# Patient Record
Sex: FEMALE | Race: BLACK OR AFRICAN AMERICAN | NOT HISPANIC OR LATINO | Employment: FULL TIME | RURAL
[De-identification: names, ages, dates, MRNs, and addresses within clinical notes are randomized per-mention and may not be internally consistent; named-entity substitution may affect disease eponyms.]

---

## 2020-01-22 ENCOUNTER — HISTORICAL (OUTPATIENT)
Dept: ADMINISTRATIVE | Facility: HOSPITAL | Age: 45
End: 2020-01-22

## 2020-01-25 LAB
LAB AP CLINICAL INFORMATION: NORMAL
LAB AP COMMENTS: NORMAL
LAB AP GENERAL CAT - HISTORICAL: NORMAL
LAB AP INTERPRETATION/RESULT - HISTORICAL: NEGATIVE
LAB AP SPECIMEN ADEQUACY - HISTORICAL: NORMAL
LAB AP SPECIMEN SUBMITTED - HISTORICAL: NORMAL

## 2021-02-02 ENCOUNTER — HISTORICAL (OUTPATIENT)
Dept: ADMINISTRATIVE | Facility: HOSPITAL | Age: 46
End: 2021-02-02

## 2021-04-23 ENCOUNTER — HISTORICAL (OUTPATIENT)
Dept: ADMINISTRATIVE | Facility: HOSPITAL | Age: 46
End: 2021-04-23

## 2021-08-04 ENCOUNTER — OFFICE VISIT (OUTPATIENT)
Dept: OBSTETRICS AND GYNECOLOGY | Facility: CLINIC | Age: 46
End: 2021-08-04
Payer: COMMERCIAL

## 2021-08-04 VITALS
HEART RATE: 89 BPM | HEIGHT: 62 IN | SYSTOLIC BLOOD PRESSURE: 124 MMHG | BODY MASS INDEX: 29.44 KG/M2 | WEIGHT: 160 LBS | DIASTOLIC BLOOD PRESSURE: 82 MMHG | RESPIRATION RATE: 16 BRPM | TEMPERATURE: 98 F

## 2021-08-04 DIAGNOSIS — R10.2 PELVIC PAIN: ICD-10-CM

## 2021-08-04 DIAGNOSIS — Z01.419 WOMEN'S ANNUAL ROUTINE GYNECOLOGICAL EXAMINATION: Primary | ICD-10-CM

## 2021-08-04 DIAGNOSIS — Z72.51 HIGH RISK SEXUAL BEHAVIOR, UNSPECIFIED TYPE: ICD-10-CM

## 2021-08-04 DIAGNOSIS — D21.9 FIBROIDS: ICD-10-CM

## 2021-08-04 DIAGNOSIS — N92.0 MENORRHAGIA WITH REGULAR CYCLE: ICD-10-CM

## 2021-08-04 LAB
BASOPHILS # BLD AUTO: 0.02 K/UL (ref 0–0.2)
BASOPHILS NFR BLD AUTO: 0.4 % (ref 0–1)
CANDIDA SPECIES: NEGATIVE
DIFFERENTIAL METHOD BLD: ABNORMAL
EOSINOPHIL # BLD AUTO: 0.18 K/UL (ref 0–0.5)
EOSINOPHIL NFR BLD AUTO: 3.5 % (ref 1–4)
ERYTHROCYTE [DISTWIDTH] IN BLOOD BY AUTOMATED COUNT: 12.9 % (ref 11.5–14.5)
GARDNERELLA: POSITIVE
HBV SURFACE AG SERPL QL IA: NORMAL
HCT VFR BLD AUTO: 42.9 % (ref 38–47)
HGB BLD-MCNC: 13.8 G/DL (ref 12–16)
HIV 1+O+2 AB SERPL QL: NORMAL
IMM GRANULOCYTES # BLD AUTO: 0.01 K/UL (ref 0–0.04)
IMM GRANULOCYTES NFR BLD: 0.2 % (ref 0–0.4)
LYMPHOCYTES # BLD AUTO: 1.08 K/UL (ref 1–4.8)
LYMPHOCYTES NFR BLD AUTO: 21.2 % (ref 27–41)
MCH RBC QN AUTO: 27.2 PG (ref 27–31)
MCHC RBC AUTO-ENTMCNC: 32.2 G/DL (ref 32–36)
MCV RBC AUTO: 84.6 FL (ref 80–96)
MONOCYTES # BLD AUTO: 0.3 K/UL (ref 0–0.8)
MONOCYTES NFR BLD AUTO: 5.9 % (ref 2–6)
MPC BLD CALC-MCNC: 9.9 FL (ref 9.4–12.4)
NEUTROPHILS # BLD AUTO: 3.5 K/UL (ref 1.8–7.7)
NEUTROPHILS NFR BLD AUTO: 68.8 % (ref 53–65)
NRBC # BLD AUTO: 0 X10E3/UL
NRBC, AUTO (.00): 0 %
PLATELET # BLD AUTO: 220 K/UL (ref 150–400)
RBC # BLD AUTO: 5.07 M/UL (ref 4.2–5.4)
SYPHILIS AB INTERPRETATION: REACTIVE
TRICHOMONAS: NEGATIVE
WBC # BLD AUTO: 5.09 K/UL (ref 4.5–11)

## 2021-08-04 PROCEDURE — 87491 CHLMYD TRACH DNA AMP PROBE: CPT | Mod: ,,, | Performed by: CLINICAL MEDICAL LABORATORY

## 2021-08-04 PROCEDURE — 85025 COMPLETE CBC W/AUTO DIFF WBC: CPT | Mod: ,,, | Performed by: CLINICAL MEDICAL LABORATORY

## 2021-08-04 PROCEDURE — 85025 CBC WITH DIFFERENTIAL: ICD-10-PCS | Mod: ,,, | Performed by: CLINICAL MEDICAL LABORATORY

## 2021-08-04 PROCEDURE — 86780 TREPONEMA PALLIDUM (SYPHILIS) ANTIBODY: ICD-10-PCS | Mod: ,,, | Performed by: CLINICAL MEDICAL LABORATORY

## 2021-08-04 PROCEDURE — 87389 HIV 1 / 2 ANTIBODY: ICD-10-PCS | Mod: ,,, | Performed by: CLINICAL MEDICAL LABORATORY

## 2021-08-04 PROCEDURE — 99396 PREV VISIT EST AGE 40-64: CPT | Mod: ,,, | Performed by: ADVANCED PRACTICE MIDWIFE

## 2021-08-04 PROCEDURE — 87480 BACTERIAL VAGINOSIS: ICD-10-PCS | Mod: ,,, | Performed by: CLINICAL MEDICAL LABORATORY

## 2021-08-04 PROCEDURE — 87491 CHLAMYDIA/GONORRHOEAE(GC), PCR: ICD-10-PCS | Mod: ,,, | Performed by: CLINICAL MEDICAL LABORATORY

## 2021-08-04 PROCEDURE — 86780 TREPONEMA PALLIDUM: CPT | Mod: ,,, | Performed by: CLINICAL MEDICAL LABORATORY

## 2021-08-04 PROCEDURE — 99396 PR PREVENTIVE VISIT,EST,40-64: ICD-10-PCS | Mod: ,,, | Performed by: ADVANCED PRACTICE MIDWIFE

## 2021-08-04 PROCEDURE — 87591 CHLAMYDIA/GONORRHOEAE(GC), PCR: ICD-10-PCS | Mod: ,,, | Performed by: CLINICAL MEDICAL LABORATORY

## 2021-08-04 PROCEDURE — 87340 HEPATITIS B SURFACE ANTIGEN: ICD-10-PCS | Mod: ,,, | Performed by: CLINICAL MEDICAL LABORATORY

## 2021-08-04 PROCEDURE — 87660 TRICHOMONAS VAGIN DIR PROBE: CPT | Mod: ,,, | Performed by: CLINICAL MEDICAL LABORATORY

## 2021-08-04 PROCEDURE — 87591 N.GONORRHOEAE DNA AMP PROB: CPT | Mod: ,,, | Performed by: CLINICAL MEDICAL LABORATORY

## 2021-08-04 PROCEDURE — 87340 HEPATITIS B SURFACE AG IA: CPT | Mod: ,,, | Performed by: CLINICAL MEDICAL LABORATORY

## 2021-08-04 PROCEDURE — 87510 GARDNER VAG DNA DIR PROBE: CPT | Mod: ,,, | Performed by: CLINICAL MEDICAL LABORATORY

## 2021-08-04 PROCEDURE — 88142 CYTOPATH C/V THIN LAYER: CPT | Mod: GCY | Performed by: ADVANCED PRACTICE MIDWIFE

## 2021-08-04 PROCEDURE — 87389 HIV-1 AG W/HIV-1&-2 AB AG IA: CPT | Mod: ,,, | Performed by: CLINICAL MEDICAL LABORATORY

## 2021-08-04 PROCEDURE — 87510 BACTERIAL VAGINOSIS: ICD-10-PCS | Mod: ,,, | Performed by: CLINICAL MEDICAL LABORATORY

## 2021-08-04 PROCEDURE — 86592 SYPHILIS TEST NON-TREP QUAL: CPT | Mod: ,,, | Performed by: CLINICAL MEDICAL LABORATORY

## 2021-08-04 PROCEDURE — 87480 CANDIDA DNA DIR PROBE: CPT | Mod: ,,, | Performed by: CLINICAL MEDICAL LABORATORY

## 2021-08-04 PROCEDURE — 86592 RPR: ICD-10-PCS | Mod: ,,, | Performed by: CLINICAL MEDICAL LABORATORY

## 2021-08-04 PROCEDURE — 87660 BACTERIAL VAGINOSIS: ICD-10-PCS | Mod: ,,, | Performed by: CLINICAL MEDICAL LABORATORY

## 2021-08-05 DIAGNOSIS — Z72.51 HIGH RISK SEXUAL BEHAVIOR, UNSPECIFIED TYPE: Primary | ICD-10-CM

## 2021-08-05 LAB
CHLAMYDIA BY PCR: NEGATIVE
N. GONORRHOEAE (GC) BY PCR: NEGATIVE
RPR SER-TITR: NORMAL {TITER}

## 2021-08-06 ENCOUNTER — HOSPITAL ENCOUNTER (OUTPATIENT)
Dept: RADIOLOGY | Facility: HOSPITAL | Age: 46
Discharge: HOME | End: 2021-08-06
Attending: ADVANCED PRACTICE MIDWIFE
Payer: COMMERCIAL

## 2021-08-06 DIAGNOSIS — N92.0 MENORRHAGIA WITH REGULAR CYCLE: ICD-10-CM

## 2021-08-06 DIAGNOSIS — D21.9 FIBROIDS: ICD-10-CM

## 2021-08-06 LAB
GH SERPL-MCNC: NORMAL NG/ML
INSULIN SERPL-ACNC: NORMAL U[IU]/ML
LAB AP CLINICAL INFORMATION: NORMAL
LAB AP GYN INTERPRETATION: NEGATIVE
LAB AP PAP DISCLAIMER COMMENTS: NORMAL
RENIN PLAS-CCNC: NORMAL NG/ML/H

## 2021-08-06 PROCEDURE — 76856 US EXAM PELVIC COMPLETE: CPT | Mod: TC

## 2021-08-12 ENCOUNTER — HOSPITAL ENCOUNTER (OUTPATIENT)
Dept: RADIOLOGY | Facility: HOSPITAL | Age: 46
Discharge: HOME | End: 2021-08-12
Attending: ADVANCED PRACTICE MIDWIFE
Payer: COMMERCIAL

## 2021-08-12 DIAGNOSIS — Z01.419 WOMEN'S ANNUAL ROUTINE GYNECOLOGICAL EXAMINATION: ICD-10-CM

## 2021-08-12 PROCEDURE — 77067 SCR MAMMO BI INCL CAD: CPT | Mod: TC

## 2024-02-28 ENCOUNTER — HOSPITAL ENCOUNTER (OUTPATIENT)
Dept: RADIOLOGY | Facility: HOSPITAL | Age: 49
Discharge: HOME | End: 2024-02-28
Attending: NURSE PRACTITIONER
Payer: COMMERCIAL

## 2024-02-28 DIAGNOSIS — M25.552 LEFT HIP PAIN: Primary | ICD-10-CM

## 2024-02-28 DIAGNOSIS — M25.552 LEFT HIP PAIN: ICD-10-CM

## 2024-02-28 PROCEDURE — 73502 X-RAY EXAM HIP UNI 2-3 VIEWS: CPT | Mod: TC,LT

## 2025-01-26 ENCOUNTER — HOSPITAL ENCOUNTER (EMERGENCY)
Facility: HOSPITAL | Age: 50
Discharge: HOME | End: 2025-01-26
Payer: COMMERCIAL

## 2025-01-26 VITALS
OXYGEN SATURATION: 100 % | BODY MASS INDEX: 33.13 KG/M2 | HEIGHT: 62 IN | TEMPERATURE: 101 F | RESPIRATION RATE: 18 BRPM | DIASTOLIC BLOOD PRESSURE: 67 MMHG | HEART RATE: 110 BPM | WEIGHT: 180 LBS | SYSTOLIC BLOOD PRESSURE: 108 MMHG

## 2025-01-26 DIAGNOSIS — J10.1 INFLUENZA A: Primary | ICD-10-CM

## 2025-01-26 DIAGNOSIS — R05.1 ACUTE COUGH: ICD-10-CM

## 2025-01-26 DIAGNOSIS — R00.0 TACHYCARDIA: ICD-10-CM

## 2025-01-26 DIAGNOSIS — J02.9 SORE THROAT: ICD-10-CM

## 2025-01-26 DIAGNOSIS — J34.89 RHINORRHEA: ICD-10-CM

## 2025-01-26 DIAGNOSIS — R52 BODY ACHES: ICD-10-CM

## 2025-01-26 DIAGNOSIS — R53.83 FATIGUE, UNSPECIFIED TYPE: ICD-10-CM

## 2025-01-26 DIAGNOSIS — R50.9 FEVER, UNSPECIFIED FEVER CAUSE: ICD-10-CM

## 2025-01-26 LAB
ALBUMIN SERPL BCP-MCNC: 3.8 G/DL (ref 3.5–5)
ALBUMIN/GLOB SERPL: 0.9 {RATIO}
ALP SERPL-CCNC: 62 U/L (ref 40–150)
ALT SERPL W P-5'-P-CCNC: 7 U/L
ANION GAP SERPL CALCULATED.3IONS-SCNC: 15 MMOL/L (ref 7–16)
AST SERPL W P-5'-P-CCNC: 17 U/L (ref 5–34)
BASOPHILS # BLD AUTO: 0.02 K/UL (ref 0–0.2)
BASOPHILS NFR BLD AUTO: 0.4 % (ref 0–1)
BILIRUB SERPL-MCNC: 0.2 MG/DL
BUN SERPL-MCNC: 6 MG/DL (ref 7–19)
BUN/CREAT SERPL: 5 (ref 6–20)
CALCIUM SERPL-MCNC: 9.2 MG/DL (ref 8.4–10.2)
CHLORIDE SERPL-SCNC: 101 MMOL/L (ref 98–107)
CO2 SERPL-SCNC: 22 MMOL/L (ref 22–29)
CREAT SERPL-MCNC: 1.11 MG/DL (ref 0.55–1.02)
DIFFERENTIAL METHOD BLD: ABNORMAL
EGFR (NO RACE VARIABLE) (RUSH/TITUS): 62 ML/MIN/1.73M2
EOSINOPHIL # BLD AUTO: 0.01 K/UL (ref 0–0.5)
EOSINOPHIL NFR BLD AUTO: 0.2 % (ref 1–4)
ERYTHROCYTE [DISTWIDTH] IN BLOOD BY AUTOMATED COUNT: 12.5 % (ref 11.5–14.5)
GLOBULIN SER-MCNC: 4.3 G/DL (ref 2–4)
GLUCOSE SERPL-MCNC: 121 MG/DL (ref 74–100)
HCT VFR BLD AUTO: 41.5 % (ref 38–47)
HGB BLD-MCNC: 13.7 G/DL (ref 12–16)
IMM GRANULOCYTES # BLD AUTO: 0.03 K/UL (ref 0–0.04)
IMM GRANULOCYTES NFR BLD: 0.5 % (ref 0–0.4)
INFLUENZA A MOLECULAR (OHS): POSITIVE
INFLUENZA B MOLECULAR (OHS): NEGATIVE
LYMPHOCYTES # BLD AUTO: 0.4 K/UL (ref 1–4.8)
LYMPHOCYTES NFR BLD AUTO: 7.1 % (ref 27–41)
MCH RBC QN AUTO: 27.2 PG (ref 27–31)
MCHC RBC AUTO-ENTMCNC: 33 G/DL (ref 32–36)
MCV RBC AUTO: 82.3 FL (ref 80–96)
MONOCYTES # BLD AUTO: 0.34 K/UL (ref 0–0.8)
MONOCYTES NFR BLD AUTO: 6 % (ref 2–6)
MPC BLD CALC-MCNC: 9.5 FL (ref 9.4–12.4)
NEUTROPHILS # BLD AUTO: 4.82 K/UL (ref 1.8–7.7)
NEUTROPHILS NFR BLD AUTO: 85.8 % (ref 53–65)
NRBC # BLD AUTO: 0 X10E3/UL
NRBC, AUTO (.00): 0 %
PLATELET # BLD AUTO: 174 K/UL (ref 150–400)
POTASSIUM SERPL-SCNC: 3.9 MMOL/L (ref 3.5–5.1)
PROT SERPL-MCNC: 8.1 G/DL (ref 6.4–8.3)
RBC # BLD AUTO: 5.04 M/UL (ref 4.2–5.4)
SARS-COV-2 RDRP RESP QL NAA+PROBE: NEGATIVE
SODIUM SERPL-SCNC: 134 MMOL/L (ref 136–145)
WBC # BLD AUTO: 5.62 K/UL (ref 4.5–11)

## 2025-01-26 PROCEDURE — 99284 EMERGENCY DEPT VISIT MOD MDM: CPT | Mod: 25

## 2025-01-26 PROCEDURE — 87502 INFLUENZA DNA AMP PROBE: CPT

## 2025-01-26 PROCEDURE — 96360 HYDRATION IV INFUSION INIT: CPT

## 2025-01-26 PROCEDURE — 25000003 PHARM REV CODE 250

## 2025-01-26 PROCEDURE — 99284 EMERGENCY DEPT VISIT MOD MDM: CPT | Mod: ,,,

## 2025-01-26 PROCEDURE — 87635 SARS-COV-2 COVID-19 AMP PRB: CPT

## 2025-01-26 PROCEDURE — 85025 COMPLETE CBC W/AUTO DIFF WBC: CPT

## 2025-01-26 PROCEDURE — 80053 COMPREHEN METABOLIC PANEL: CPT

## 2025-01-26 RX ORDER — SODIUM CHLORIDE 9 MG/ML
INJECTION, SOLUTION INTRAVENOUS
Status: DISCONTINUED
Start: 2025-01-26 | End: 2025-01-26 | Stop reason: HOSPADM

## 2025-01-26 RX ORDER — OSELTAMIVIR PHOSPHATE 75 MG/1
75 CAPSULE ORAL
Status: COMPLETED | OUTPATIENT
Start: 2025-01-26 | End: 2025-01-26

## 2025-01-26 RX ORDER — IBUPROFEN 200 MG
600 TABLET ORAL
Status: COMPLETED | OUTPATIENT
Start: 2025-01-26 | End: 2025-01-26

## 2025-01-26 RX ORDER — ACETAMINOPHEN 500 MG
1000 TABLET ORAL
Status: COMPLETED | OUTPATIENT
Start: 2025-01-26 | End: 2025-01-26

## 2025-01-26 RX ORDER — OSELTAMIVIR PHOSPHATE 75 MG/1
75 CAPSULE ORAL 2 TIMES DAILY
Qty: 10 CAPSULE | Refills: 0 | Status: SHIPPED | OUTPATIENT
Start: 2025-01-26 | End: 2025-01-31

## 2025-01-26 RX ADMIN — SODIUM CHLORIDE 1000 ML: 9 INJECTION, SOLUTION INTRAVENOUS at 05:01

## 2025-01-26 RX ADMIN — ACETAMINOPHEN 1000 MG: 500 TABLET ORAL at 04:01

## 2025-01-26 RX ADMIN — IBUPROFEN 600 MG: 200 TABLET, FILM COATED ORAL at 05:01

## 2025-01-26 RX ADMIN — OSELTAMAVIR PHOSPHATE 75 MG: 75 CAPSULE ORAL at 05:01

## 2025-01-26 NOTE — Clinical Note
"Regla"Jaspreet Lazo was seen and treated in our emergency department on 1/26/2025.  She may return to work on 01/29/2025.       If you have any questions or concerns, please don't hesitate to call.      Erik Roy, FNP"

## 2025-01-26 NOTE — DISCHARGE INSTRUCTIONS
Take Tamiflu as prescribed.  Alternate Tylenol and ibuprofen over-the-counter every 4 hours for pain and fever control.  Over-the-counter daily antihistamine such as Zyrtec or loratadine.  Over-the-counter Flonase as directed.  Over-the-counter cough medication of your choice as directed.  Obtain plenty of rest.  Increase fluids to maintain proper hydration.  Follow up with the primary care provider in 1-2 days for a recheck.  Return to emergency department for any new or worrisome symptoms.

## 2025-01-26 NOTE — ED PROVIDER NOTES
Encounter Date: 1/26/2025       History     Chief Complaint   Patient presents with    Fever    Chills     49-year-old female presents to the ED with complaints of fever, chills, body aches, congestion, and cough over the past 2 days.  She did treat with 1 dose of Tylenol yesterday afternoon but no further treatment since that time.  Reports that the cough has been productive of green/yellow sputum.  Denies any recent flu vaccination this season.  Reports multiple exposures to young children with similar complaints prior to the onset of her symptoms.  Blood pressure 132/84, temperature 103.1°, heart rate 127, respirations 20, and oxygen saturation 99% on room air.  She denies any chest pain, shortness of breath, difficulty breathing, vomiting, diarrhea, abdominal pain, dysuria, or any other complaints at this time.  She appears in no immediate distress.    The history is provided by the patient.     Review of patient's allergies indicates:  No Known Allergies  History reviewed. No pertinent past medical history.  History reviewed. No pertinent surgical history.  No family history on file.  Social History     Tobacco Use    Smoking status: Never    Smokeless tobacco: Never   Substance Use Topics    Alcohol use: Not Currently     Review of Systems   Constitutional:  Positive for chills and fever.   HENT:  Positive for congestion and sore throat.    Eyes: Negative.    Respiratory:  Positive for cough. Negative for shortness of breath, wheezing and stridor.    Cardiovascular:  Negative for chest pain and palpitations.   Gastrointestinal:  Negative for abdominal pain, diarrhea, nausea and vomiting.   Endocrine: Negative.    Genitourinary:  Negative for dysuria and frequency.   Musculoskeletal:  Positive for myalgias. Negative for back pain and neck stiffness.   Skin:  Negative for color change, pallor and rash.   Allergic/Immunologic: Negative.    Neurological:  Positive for headaches. Negative for dizziness, syncope,  speech difficulty and weakness.   Hematological:  Does not bruise/bleed easily.   Psychiatric/Behavioral:  Negative for confusion. The patient is not nervous/anxious.    All other systems reviewed and are negative.      Physical Exam     Initial Vitals [01/26/25 0436]   BP Pulse Resp Temp SpO2   132/84 (!) 127 20 (!) 103.1 °F (39.5 °C) 99 %      MAP       --         Physical Exam    Nursing note and vitals reviewed.  Constitutional: She appears well-developed and well-nourished. She is not diaphoretic. She is active and cooperative.  Non-toxic appearance. She appears ill. No distress.   HENT:   Head: Normocephalic and atraumatic.   Right Ear: Tympanic membrane, external ear and ear canal normal.   Left Ear: Tympanic membrane, external ear and ear canal normal.   Nose: Rhinorrhea present. Mouth/Throat: Mucous membranes are normal. Posterior oropharyngeal erythema present. No oropharyngeal exudate.   Eyes: Conjunctivae and EOM are normal. Pupils are equal, round, and reactive to light.   Neck: Neck supple.   Normal range of motion.   Full passive range of motion without pain.     Cardiovascular:  Regular rhythm, normal heart sounds and normal pulses.   Tachycardia present.         Pulmonary/Chest: Effort normal and breath sounds normal. No accessory muscle usage. No tachypnea. No respiratory distress. She has no decreased breath sounds. She has no wheezes. She has no rhonchi. She has no rales. She exhibits no tenderness.   Abdominal: Abdomen is soft. Bowel sounds are normal. She exhibits no distension. There is no abdominal tenderness. There is no rebound and no guarding.   Musculoskeletal:         General: Normal range of motion.      Cervical back: Full passive range of motion without pain, normal range of motion and neck supple.     Neurological: She is alert and oriented to person, place, and time. She has normal strength. GCS score is 15. GCS eye subscore is 4. GCS verbal subscore is 5. GCS motor subscore is 6.    Skin: Skin is warm and dry. Capillary refill takes less than 2 seconds.   Psychiatric: She has a normal mood and affect. Her behavior is normal. Judgment and thought content normal.         Medical Screening Exam   See Full Note    ED Course   Procedures  Labs Reviewed   INFLUENZA A & B BY MOLECULAR - Abnormal       Result Value    INFLUENZA A MOLECULAR Positive (*)     INFLUENZA B MOLECULAR  Negative     COMPREHENSIVE METABOLIC PANEL - Abnormal    Sodium 134 (*)     Potassium 3.9      Chloride 101      CO2 22      Anion Gap 15      Glucose 121 (*)     BUN 6 (*)     Creatinine 1.11 (*)     BUN/Creatinine Ratio 5 (*)     Calcium 9.2      Total Protein 8.1      Albumin 3.8      Globulin 4.3 (*)     A/G Ratio 0.9      Bilirubin, Total 0.2      Alk Phos 62      ALT 7      AST 17      eGFR 62     CBC WITH DIFFERENTIAL - Abnormal    WBC 5.62      RBC 5.04      Hemoglobin 13.7      Hematocrit 41.5      MCV 82.3      MCH 27.2      MCHC 33.0      RDW 12.5      Platelet Count 174      MPV 9.5      Neutrophils % 85.8 (*)     Lymphocytes % 7.1 (*)     Monocytes % 6.0      Eosinophils % 0.2 (*)     Basophils % 0.4      Immature Granulocytes % 0.5 (*)     nRBC, Auto 0.0      Neutrophils, Abs 4.82      Lymphocytes, Absolute 0.40 (*)     Monocytes, Absolute 0.34      Eosinophils, Absolute 0.01      Basophils, Absolute 0.02      Immature Granulocytes, Absolute 0.03      nRBC, Absolute 0.00      Diff Type Auto     SARS-COV-2 RNA AMPLIFICATION, QUAL - Normal    SARS COV-2 Molecular Negative      Narrative:     Negative SARS-CoV results should not be used as the sole basis for treatment or patient management decisions; negative results should be considered in the context of a patient's recent exposures, history and the presene of clinical signs and symptoms consistent with COVID-19.  Negative results should be treated as presumptive and confirmed by molecular assay, if necessary for patient management.   CBC W/ AUTO DIFFERENTIAL     Narrative:     The following orders were created for panel order CBC auto differential.  Procedure                               Abnormality         Status                     ---------                               -----------         ------                     CBC with Differential[[id number]]        Abnormal            Final result                 Please view results for these tests on the individual orders.          Imaging Results    None          Medications   sodium chloride 0.9% bolus 1,000 mL 1,000 mL (1,000 mLs Intravenous New Bag 1/26/25 6011)   ibuprofen tablet 600 mg (has no administration in time range)   acetaminophen tablet 1,000 mg (1,000 mg Oral Given 1/26/25 1756)   oseltamivir capsule 75 mg (75 mg Oral Given 1/26/25 1262)     Medical Decision Making  Presents for URI symptoms over the past 2 days after multiple recent ill exposures.  Alert and oriented x4.  GCS 15.  Currently febrile with a temperature of 103.1° and tachycardic with a heart rate of 127.  She does appear ill but nontoxic.  Denies any difficulty breathing or shortness of breath.  Breath sounds are equal and clear bilaterally to auscultation.  Bilateral TMs are clear.  Mild posterior pharyngeal erythema without exudate.  Abdomen is soft and nontender.  Tolerating p.o. without any vomiting or diarrhea.  No recent treatment for the fever or body aches so we will provide Tylenol 1 g p.o. Suspect viral URI so we will send swabs for flu and COVID.  P.o. challenge performed and tolerated well.    COVID negative but flu swab positive for influenza A.  Patient's blood pressure did notice to decrease to 89/58 and heart rate remains tachycardic at 111.  Patient was reexamined and repeat exam reveals no acute changes from initial evaluation but we do feel the patient would benefit from additional hydration so we will initiate IV access, obtain baseline labs, and provide normal saline 1 L bolus IV.  Plan and current results discussed in detail  with the patient and her family.  Routine symptomatic over-the-counter treatment discussed in detail.  Risks versus benefits of Tamiflu therapy discussed in detail and she is requesting this medication.  We will provide 1st dose here in the ED.    05:38 patient re-evaluated at this time.  Family remains at the bedside.  She has received the majority with the IV fluids as well as Tylenol and Tamiflu and reports that she is feeling much better.  Heart rate improved.  Temperature decreased to 100.7.  She reports that she does not have any ibuprofen currently at home but we will pick him up today.  We will provide ibuprofen 600 mg prior to discharge for additional pain and fever control.  We did recap medication information, over-the-counter home care recommendations, follow up, the importance of hydration, and very strict ED return precautions.  All questions answered.  We will provide written detailed instructions as well.  Patient and family verbalized understanding and in agreement with this plan.    Amount and/or Complexity of Data Reviewed  Independent Historian:      Details: 49-year-old female presents to the ED with complaints of fever, chills, body aches, congestion, and cough over the past 2 days.  She did treat with 1 dose of Tylenol yesterday afternoon but no further treatment since that time.  Reports that the cough has been productive of green/yellow sputum.  Denies any recent flu vaccination this season.  Reports multiple exposures to young children with similar complaints prior to the onset of her symptoms.  Blood pressure 132/84, temperature 103.1°, heart rate 127, respirations 20, and oxygen saturation 99% on room air.  She denies any chest pain, shortness of breath, difficulty breathing, vomiting, diarrhea, abdominal pain, dysuria, or any other complaints at this time.  She appears in no immediate distress.  Labs: ordered.     Details: COVID negative.  Flu swab positive for influenza A.  CBC shows a  WBC of 5.62, hemoglobin of 13.7, hematocrit of 41.5, and a platelet count 174.  CMP shows a sodium of 134, glucose of 121, BUN of 6, creatinine of 1.11, BUN/creatinine ratio of 5, globulin of 4.3, and otherwise unremarkable.    Risk  OTC drugs.  Prescription drug management.  Risk Details: The presentation of Regla Lazo is consistent with upper respiratory infection, viral in nature. There were no clinical or ancillary findings suggestive of bronchitis, pneumonia, acute sinusitis, chronic sinusitis, or streptococcal pharyngitis, thus there was no indications for antibiotics.    Upon discharge, Regla Lazo has no evidence of respiratory failure and is comfortable without respiratory distress. Additionally, Regla Lazo has no evidence of airway compromise and is speaking in full/complete sentences without difficulty. Regla Lazo meets outpatient treatment criteria.    Data Reviewed/Counseling: I have reviewed the patient's vital signs, nursing notes, and other relevant ancillary testing/information. I have had a detailed discussion with the patient regarding the historical points, examination findings, and any diagnostic results. I have also discussed the need for outpatient follow-up. I have recommended symptomatic therapy with over the counter remedies. Symptomatic therapy suggested: acetaminophen, ibuprofen, daily antihistamine, topical nasal steroid, over-the-counter cough medication of your choice, and Tamiflu as prescribed. Increase fluids, use vaporizer, stay in steamy bathroom tid 15 min prn severe cough, Tylenol/Motrin as needed, rest, avoid smoky areas. Follow up with PCP in 1-2 days for a recheck.    Regla Lazo has been counseled to return to the Emergency Department if symptoms worsen or if there are any questions or concerns that arise while at home.    Regla Lazo was encouraged to practice good infection control procedures to include but not limited to frequent hand washing to lessen likelihood  of transmission of this infection.                                       Clinical Impression:   Final diagnoses:  [R50.9] Fever, unspecified fever cause  [J34.89] Rhinorrhea  [J02.9] Sore throat  [R05.1] Acute cough  [R52] Body aches  [R53.83] Fatigue, unspecified type  [R00.0] Tachycardia  [J10.1] Influenza A (Primary)        ED Disposition Condition    Discharge Stable          ED Prescriptions       Medication Sig Dispense Start Date End Date Auth. Provider    oseltamivir (TAMIFLU) 75 MG capsule Take 1 capsule (75 mg total) by mouth 2 (two) times daily. for 5 days 10 capsule 1/26/2025 1/31/2025 Erik Roy FNP          Follow-up Information       Follow up With Specialties Details Why Contact Info    Mathew Savage FNP-C Family Medicine Schedule an appointment as soon as possible for a visit in 2 days  [address] [place] [id number]  [phone]               Erik Roy FNP  01/26/25 0550

## 2025-01-30 ENCOUNTER — HOSPITAL ENCOUNTER (OUTPATIENT)
Dept: RADIOLOGY | Facility: HOSPITAL | Age: 50
Discharge: HOME | End: 2025-01-30
Attending: NURSE PRACTITIONER
Payer: COMMERCIAL

## 2025-01-30 DIAGNOSIS — R09.89 RESPIRATORY CRACKLES: Primary | ICD-10-CM

## 2025-01-30 DIAGNOSIS — R09.89 RESPIRATORY CRACKLES: ICD-10-CM

## 2025-01-30 PROCEDURE — 71046 X-RAY EXAM CHEST 2 VIEWS: CPT | Mod: TC
